# Patient Record
(demographics unavailable — no encounter records)

---

## 2025-05-06 NOTE — DISCUSSION/SUMMARY
[de-identified] : The patient's condition is acute  Tests/Studies Independently Interpreted Today    X-rays of L ankle were obtained and reviewed on 04/30/2025 . 3 views were obtained including AP, lateral, internal oblique. I independently reviewed the films and the clinically relevant findings include questionable mortis widening  ------------------------------------------------------------------------------------------------------------------  Considering questionable Mortis widening on xray rec MRI to further eval   Due to worsening pain recommend the patient obtain MRI L ankle to eval mortis widening. Follow up after MRI to possibly rule out surgical pathology and discuss future treatment options.   The patient will begin use of pneumatic CAM boot to ensure stability and proper healing - fitted and dispensed in office today.   Prescribed PT-rec she get into PT quickly to work on the swelling discussed need for orif of ankle with syndesmotic screw if mri confirms widening , discussed risks and benefits of surgery  The patient has been prescribed Ibuprofen 600mg T.I.D and discussed risks of side effects, as well as timing/management of medication.  Side effects can include but are not limited to gastrointestinal ulcers and irritation, kidney failure, and bleeding issues. Use as directed and take with food to manage pain, inflammation, and discomfort.   f/u after MRI   I, Eli Kline, attest that this documentation has been prepared under the direction and in the presence of Provider Dr. Hao Ramirez

## 2025-05-06 NOTE — HISTORY OF PRESENT ILLNESS
[de-identified] : Pt is here for an eval of left ankle pain. States she fell on her teammates foot at volleyball last night and twisted her ankle. Pain is an 7/10, iced last night with some relief. Pain localized to the ankle, swollen. Cannot walk on it, on crutches and in wrap. No hx of ankle issues.

## 2025-05-06 NOTE — PHYSICAL EXAM
[Left] : left foot and ankle [Moderate] : moderate diffused ankle swelling [] : not able to perform single heel raise [FreeTextEntry9] :  X-rays of L ankle revealed questionable Mortis widening

## 2025-05-06 NOTE — HISTORY OF PRESENT ILLNESS
[de-identified] : Pt is here for an eval of left ankle pain. States she fell on her teammates foot at volleyball last night and twisted her ankle. Pain is an 7/10, iced last night with some relief. Pain localized to the ankle, swollen. Cannot walk on it, on crutches and in wrap. No hx of ankle issues.

## 2025-05-06 NOTE — RETURN TO WORK/SCHOOL
[FreeTextEntry1] :   To whom it may concern   Diagnosis: ankle sprain   _ Patient may return to work: _ Patient may not return to work until: _ Patient may return to school: x_ Patient cannot participate in gym/sports until followup _ Patient may return to gym/sports: _ Patient is on limited weight bearing: _ Patient may return to full activities: x_ Patient requires early release from class/elevator pass until followup   SPECIAL INSTRUCTIONS:   Please excuse the patient,DORCAS ALSTON ,   as She was seen in our office today, 04/30/2025 under the care of Dr. Ramirez   _ Full duty, no restrictions. _ Light duty, as follows: _ Limited duty, as follows: _ With Cast   _  With Brace   _ With Crutches   _ With Boot     Sincerely,   Hao Ramirez MD Co- Chief Sports Medicine [facility] Medical Director [facility] and [facility]  [facility] [employer]

## 2025-05-06 NOTE — HISTORY OF PRESENT ILLNESS
[de-identified] : Pt is here for an eval of left ankle pain. States she fell on her teammates foot at volleyball last night and twisted her ankle. Pain is an 7/10, iced last night with some relief. Pain localized to the ankle, swollen. Cannot walk on it, on crutches and in wrap. No hx of ankle issues.

## 2025-05-06 NOTE — RETURN TO WORK/SCHOOL
[FreeTextEntry1] :   To whom it may concern   Diagnosis: ankle sprain   _ Patient may return to work: _ Patient may not return to work until: _ Patient may return to school: x_ Patient cannot participate in gym/sports until followup _ Patient may return to gym/sports: _ Patient is on limited weight bearing: _ Patient may return to full activities: x_ Patient requires early release from class/elevator pass until followup   SPECIAL INSTRUCTIONS:   Please excuse the patient,DORCAS ALSTON ,   as She was seen in our office today, 04/30/2025 under the care of Dr. Ramirez   _ Full duty, no restrictions. _ Light duty, as follows: _ Limited duty, as follows: _ With Cast   _  With Brace   _ With Crutches   _ With Boot     Sincerely,   Hao Ramirez MD Co- Chief Sports Medicine [facility] Medical Director [facility] and [facility]  [facility] [employer]

## 2025-05-06 NOTE — DISCUSSION/SUMMARY
[de-identified] : The patient's condition is acute  Tests/Studies Independently Interpreted Today    X-rays of L ankle were obtained and reviewed on 04/30/2025 . 3 views were obtained including AP, lateral, internal oblique. I independently reviewed the films and the clinically relevant findings include questionable mortis widening  ------------------------------------------------------------------------------------------------------------------  Considering questionable Mortis widening on xray rec MRI to further eval   Due to worsening pain recommend the patient obtain MRI L ankle to eval mortis widening. Follow up after MRI to possibly rule out surgical pathology and discuss future treatment options.   The patient will begin use of pneumatic CAM boot to ensure stability and proper healing - fitted and dispensed in office today.   Prescribed PT-rec she get into PT quickly to work on the swelling discussed need for orif of ankle with syndesmotic screw if mri confirms widening , discussed risks and benefits of surgery  The patient has been prescribed Ibuprofen 600mg T.I.D and discussed risks of side effects, as well as timing/management of medication.  Side effects can include but are not limited to gastrointestinal ulcers and irritation, kidney failure, and bleeding issues. Use as directed and take with food to manage pain, inflammation, and discomfort.   f/u after MRI   I, Eli Kline, attest that this documentation has been prepared under the direction and in the presence of Provider Dr. Hao Ramirez

## 2025-05-06 NOTE — DISCUSSION/SUMMARY
[de-identified] : The patient's condition is acute  Tests/Studies Independently Interpreted Today    X-rays of L ankle were obtained and reviewed on 04/30/2025 . 3 views were obtained including AP, lateral, internal oblique. I independently reviewed the films and the clinically relevant findings include questionable mortis widening  ------------------------------------------------------------------------------------------------------------------  Considering questionable Mortis widening on xray rec MRI to further eval   Due to worsening pain recommend the patient obtain MRI L ankle to eval mortis widening. Follow up after MRI to possibly rule out surgical pathology and discuss future treatment options.   The patient will begin use of pneumatic CAM boot to ensure stability and proper healing - fitted and dispensed in office today.   Prescribed PT-rec she get into PT quickly to work on the swelling discussed need for orif of ankle with syndesmotic screw if mri confirms widening , discussed risks and benefits of surgery  The patient has been prescribed Ibuprofen 600mg T.I.D and discussed risks of side effects, as well as timing/management of medication.  Side effects can include but are not limited to gastrointestinal ulcers and irritation, kidney failure, and bleeding issues. Use as directed and take with food to manage pain, inflammation, and discomfort.   f/u after MRI   I, Eli Kline, attest that this documentation has been prepared under the direction and in the presence of Provider Dr. Hao Ramirez

## 2025-05-16 NOTE — HISTORY OF PRESENT ILLNESS
[de-identified] : Pt is here for a follow up on left ankle pain. Pain is a 6/10, taking Tylenol as needed. [facility]uled to [facility] at [facility]. Wearing CAM boot.

## 2025-05-16 NOTE — RETURN TO WORK/SCHOOL
[FreeTextEntry1] :    Diagnosis: high ankle sprain & lateral avulsion fracture    X: Patient cannot participate in gym/sports until follow up appointment in two weeks   Sincerely, Hao Ramirez MD Co- Chief Sports Medicine [facility] Medical Director [facility] and [facility]  [facility] [employer]

## 2025-05-16 NOTE — DISCUSSION/SUMMARY
[de-identified] : The patients condition is acute.  Confounding medical conditions/concerns: None  Tests/Studies Independently Interpreted Today: MRI left ankle reveals evidence of nondisplaced avulsion about lateral tibial physis, moderate high ankle sprains, no mortis widening, posterior tibial tendonitis.   We reviewed the mri findings and discussed their diagnosis and treatment options at length including the risks and benefits of both surgical and non-surgical options for the patients high ankle sprain and non-displaced avulsion injury. Overall, her condition is improving through proper immobilization.   The response to non-operative treatment will determine the need for further intervention, which could include additional non-operative modalities, or possible surgical intervention such as lateral ligament reconstruction.  The risks of developing chronic ankle instability and/or osteochondral defects, which may necessitate surgical management, were discussed with the patient.  The patient will begin use of lace-up ankle brace to ensure stability and avoid further injury - fitted and dispensed in office today.   She will continue use of long pneumatic CAM boot for the remainder of the week.   Start physical therapy, attending 2-3x/week for approximately 4-6 weeks   She will remain out of gym and sports, want to ensure healing of avulsion fracture   Prescribed the patient Motrin 600mgs T.I.D and discussed risks of side effects and timing and management of medication. Side effects include but are not limited to gi ulcers and irritation, as well as kidney failure and bleeding issues. Follow up in 2 weeks to discuss a return to gym & sports -- new X-Ray

## 2025-05-16 NOTE — HISTORY OF PRESENT ILLNESS
[de-identified] : Pt is here for a follow up on left ankle pain. Pain is a 6/10, taking Tylenol as needed. [facility]uled to [facility] at [facility]. Wearing CAM boot.

## 2025-05-16 NOTE — PHYSICAL EXAM
[Left] : left foot and ankle [NL (40)] : plantar flexion 40 degrees [NL 30)] : inversion 30 degrees [NL (20)] : eversion 20 degrees [4___] : eversion 4[unfilled]/5 [2+] : posterior tibialis pulse: 2+ [Normal] : saphenous nerve sensation normal [] : no calf tenderness [FreeTextEntry8] : ATFL tenderness, deltoid ligament tenderness, lateral malleolus tenderness  [de-identified] : unable to perform triple hop  [de-identified] : In long pneumatic CAM boot

## 2025-05-16 NOTE — PHYSICAL EXAM
[Left] : left foot and ankle [NL (40)] : plantar flexion 40 degrees [NL 30)] : inversion 30 degrees [NL (20)] : eversion 20 degrees [4___] : eversion 4[unfilled]/5 [2+] : posterior tibialis pulse: 2+ [Normal] : saphenous nerve sensation normal [] : no calf tenderness [FreeTextEntry8] : ATFL tenderness, deltoid ligament tenderness, lateral malleolus tenderness  [de-identified] : unable to perform triple hop  [de-identified] : In long pneumatic CAM boot

## 2025-05-16 NOTE — RETURN TO WORK/SCHOOL
[FreeTextEntry1] :    Diagnosis: high ankle sprain & lateral avulsion fracture    X: Patient cannot participate in gym/sports until follow up appointment in two weeks   Sincerely, Hao Ramirez MD Co- Chief Sports Medicine [facility] Medical Director [facility] and [facility]  [facility] [employer]

## 2025-05-16 NOTE — DISCUSSION/SUMMARY
[de-identified] : The patients condition is acute.  Confounding medical conditions/concerns: None  Tests/Studies Independently Interpreted Today: MRI left ankle reveals evidence of nondisplaced avulsion about lateral tibial physis, moderate high ankle sprains, no mortis widening, posterior tibial tendonitis.   We reviewed the mri findings and discussed their diagnosis and treatment options at length including the risks and benefits of both surgical and non-surgical options for the patients high ankle sprain and non-displaced avulsion injury. Overall, her condition is improving through proper immobilization.   The response to non-operative treatment will determine the need for further intervention, which could include additional non-operative modalities, or possible surgical intervention such as lateral ligament reconstruction.  The risks of developing chronic ankle instability and/or osteochondral defects, which may necessitate surgical management, were discussed with the patient.  The patient will begin use of lace-up ankle brace to ensure stability and avoid further injury - fitted and dispensed in office today.   She will continue use of long pneumatic CAM boot for the remainder of the week.   Start physical therapy, attending 2-3x/week for approximately 4-6 weeks   She will remain out of gym and sports, want to ensure healing of avulsion fracture   Prescribed the patient Motrin 600mgs T.I.D and discussed risks of side effects and timing and management of medication. Side effects include but are not limited to gi ulcers and irritation, as well as kidney failure and bleeding issues. Follow up in 2 weeks to discuss a return to gym & sports -- new X-Ray

## 2025-05-16 NOTE — DATA REVIEWED
[MRI] : MRI [Left] : left [Ankle] : ankle [Report was reviewed and noted in the chart] : The report was reviewed and noted in the chart [I independently reviewed and interpreted images and report] : I independently reviewed and interpreted images and report [I reviewed the films/CD] : I reviewed the films/CD [FreeTextEntry1] : MRI left ankle reveals evidence of nondisplaced avulsion about lateral tibial physis, moderate high ankle sprains, no mortis widening, posterior tibial tendonitis

## 2025-05-29 NOTE — HISTORY OF PRESENT ILLNESS
[de-identified] : 05/20/25: Pt is here to follow up for her left ankle. [facility] Pain is 3-10. Wearing tall CAM boot.

## 2025-05-29 NOTE — DISCUSSION/SUMMARY
[de-identified] : The patients condition is acute.  Confounding medical conditions/concerns: None  Tests/Studies Independently Interpreted Today: X-Ray left ankle 3 views (AP, lateral, internal oblique) were obtained and independently reviewed on Apr 29, 2025 reveals evidence of sclerotic change and callous formation about healed nondisplaced avulsion about lateral tibial physis.   The patient continues to improve on a gradual, interval basis reporting no recurrent symptoms or instability. Grossly benign physical examination upon office visit and is able to triple hop without pain.   We recommended she start to gradually transition form long pneumatic CAM boot into lace-up ankle support At this time, the patient has no activity restrictions and may continue to advance activity as pain permits.   The response to non-operative treatment will determine the need for further intervention, which could include additional non-operative modalities, or possible surgical intervention such as lateral ligament reconstruction.  The risks of developing chronic ankle instability and/or osteochondral defects, which may necessitate surgical management, were discussed with the patient  Prescribed physical therapy today, the patient will attend 2-3x/week for approximately 4-6 weeks   The patient may return to gym and sports in her lace-up   Prescribed the patient Motrin 600mgs T.I.D and discussed risks of side effects and timing and management of medication. Side effects include but are not limited to gi ulcers and irritation, as well as kidney failure and bleeding issues.  Follow up in 2 weeks if any discomfort persists

## 2025-05-29 NOTE — RETURN TO WORK/SCHOOL
[FreeTextEntry1] :    Diagnosis: Left ankle healed avulsion Fx and high ankle sprain    X: Patient may return to school with excused lateness on May 20, 2025     X: Patient may return to gym/sports May 20, 2025     X: Patient may return to full activities May 20, 2025     Sincerely, Hao Ramirez MD Co- Chief Sports Medicine [facility] Medical Director [facility]  [facility] [employer]

## 2025-05-29 NOTE — RETURN TO WORK/SCHOOL
[FreeTextEntry1] :    Diagnosis: Left ankle healed avulsion Fx and high ankle sprain    X: Patient may return to school with excused lateness on May 20, 2025     X: Patient may return to gym/sports May 20, 2025     X: Patient may return to full activities May 20, 2025     Sincerely, Hao Ramirez MD Co- Chief Sports Medicine [facility] Medical Director [facility] and [facility]  [facility] [employer]

## 2025-05-29 NOTE — DISCUSSION/SUMMARY
[de-identified] : The patients condition is acute.  Confounding medical conditions/concerns: None  Tests/Studies Independently Interpreted Today: X-Ray left ankle 3 views (AP, lateral, internal oblique) were obtained and independently reviewed on Apr 29, 2025 reveals evidence of sclerotic change and callous formation about healed nondisplaced avulsion about lateral tibial physis.   The patient continues to improve on a gradual, interval basis reporting no recurrent symptoms or instability. Grossly benign physical examination upon office visit and is able to triple hop without pain.   We recommended she start to gradually transition form long pneumatic CAM boot into lace-up ankle support At this time, the patient has no activity restrictions and may continue to advance activity as pain permits.   The response to non-operative treatment will determine the need for further intervention, which could include additional non-operative modalities, or possible surgical intervention such as lateral ligament reconstruction.  The risks of developing chronic ankle instability and/or osteochondral defects, which may necessitate surgical management, were discussed with the patient  Prescribed physical therapy today, the patient will attend 2-3x/week for approximately 4-6 weeks   The patient may return to gym and sports in her lace-up   Prescribed the patient Motrin 600mgs T.I.D and discussed risks of side effects and timing and management of medication. Side effects include but are not limited to gi ulcers and irritation, as well as kidney failure and bleeding issues.  Follow up in 2 weeks if any discomfort persists

## 2025-05-29 NOTE — PHYSICAL EXAM
[Left] : left foot and ankle [NL (40)] : plantar flexion 40 degrees [NL 30)] : inversion 30 degrees [NL (20)] : eversion 20 degrees [5___] : eversion 5[unfilled]/5 [2+] : posterior tibialis pulse: 2+ [Normal] : saphenous nerve sensation normal [] : no syndesmosis tenderness [FreeTextEntry8] : Mild ATFL tenderness, no deltoid ligament tenderness, no lateral malleolus tenderness  [de-identified] : -triple hop  [de-identified] : In long pneumatic CAM boot

## 2025-05-29 NOTE — HISTORY OF PRESENT ILLNESS
[de-identified] : 05/20/25: Pt is here to follow up for her left ankle. [facility] Pain is 3-10. Wearing tall CAM boot.

## 2025-05-29 NOTE — DISCUSSION/SUMMARY
[de-identified] : The patients condition is acute.  Confounding medical conditions/concerns: None  Tests/Studies Independently Interpreted Today: X-Ray left ankle 3 views (AP, lateral, internal oblique) were obtained and independently reviewed on Apr 29, 2025 reveals evidence of sclerotic change and callous formation about healed nondisplaced avulsion about lateral tibial physis.   The patient continues to improve on a gradual, interval basis reporting no recurrent symptoms or instability. Grossly benign physical examination upon office visit and is able to triple hop without pain.   We recommended she start to gradually transition form long pneumatic CAM boot into lace-up ankle support At this time, the patient has no activity restrictions and may continue to advance activity as pain permits.   The response to non-operative treatment will determine the need for further intervention, which could include additional non-operative modalities, or possible surgical intervention such as lateral ligament reconstruction.  The risks of developing chronic ankle instability and/or osteochondral defects, which may necessitate surgical management, were discussed with the patient  Prescribed physical therapy today, the patient will attend 2-3x/week for approximately 4-6 weeks   The patient may return to gym and sports in her lace-up   Prescribed the patient Motrin 600mgs T.I.D and discussed risks of side effects and timing and management of medication. Side effects include but are not limited to gi ulcers and irritation, as well as kidney failure and bleeding issues.  Follow up in 2 weeks if any discomfort persists

## 2025-05-29 NOTE — HISTORY OF PRESENT ILLNESS
[de-identified] : 05/20/25: Pt is here to follow up for her left ankle. [facility] Pain is 3-10. Wearing tall CAM boot.

## 2025-05-29 NOTE — PHYSICAL EXAM
[Left] : left foot and ankle [NL (40)] : plantar flexion 40 degrees [NL 30)] : inversion 30 degrees [NL (20)] : eversion 20 degrees [5___] : eversion 5[unfilled]/5 [2+] : posterior tibialis pulse: 2+ [Normal] : saphenous nerve sensation normal [] : no syndesmosis tenderness [FreeTextEntry8] : Mild ATFL tenderness, no deltoid ligament tenderness, no lateral malleolus tenderness  [de-identified] : -triple hop  [de-identified] : In long pneumatic CAM boot

## 2025-05-29 NOTE — PHYSICAL EXAM
[Left] : left foot and ankle [NL (40)] : plantar flexion 40 degrees [NL 30)] : inversion 30 degrees [NL (20)] : eversion 20 degrees [5___] : eversion 5[unfilled]/5 [2+] : posterior tibialis pulse: 2+ [Normal] : saphenous nerve sensation normal [] : no syndesmosis tenderness [FreeTextEntry8] : Mild ATFL tenderness, no deltoid ligament tenderness, no lateral malleolus tenderness  [de-identified] : -triple hop  [de-identified] : In long pneumatic CAM boot

## 2025-05-29 NOTE — RETURN TO WORK/SCHOOL
[FreeTextEntry1] :    Diagnosis: Left ankle healed avulsion Fx and high ankle sprain    X: Patient may return to school with excused lateness on May 20, 2025     X: Patient may return to gym/sports May 20, 2025     X: Patient may return to full activities May 20, 2025     Sincerely, Hao Ramirez MD Co- Chief Sports Medicine [facility] Medical Director [facility]  [facility] [employer]

## 2025-07-15 NOTE — HISTORY OF PRESENT ILLNESS
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  She denies any cardiovascular complaints such as cyanosis, palpitations, chest pain, exercise intolerance, or syncope.  She is on iron low iron anemia.  There are no allergies.  By history she has not elevated cholesterol due to liberal cheese intake.  There are no known medication allergies; she is allergic to apples.  She will be entering the 10th grade at [employer] and plays competitive volleyball.

## 2025-07-15 NOTE — REASON FOR VISIT
[Follow-Up] : a follow-up visit for [Father] : father [FreeTextEntry3] : Sports Clearance history of LV non compaction

## 2025-07-15 NOTE — CLEARANCE
[Cleared] : ~He/She~ is cleared for dental procedure.  [Does not require] : ~He/She~ does not require SBE prophylaxis [May participate in the entire physical education program without restriction, including all varsity] : ~He/She~ may participate in the entire physical education program without restriction, including all varsity competitive sports.

## 2025-07-15 NOTE — DISCUSSION/SUMMARY
[PE + No Restrictions] : [unfilled] may participate in the entire physical education program without restriction, including all varsity competitive sports. [Needs SBE Prophylaxis] : [unfilled] does not need bacterial endocarditis prophylaxis [FreeTextEntry1] : Follow-up in 1 year or as needed

## 2025-07-15 NOTE — CONSULT LETTER
[Today's Date] : [unfilled] [Dear  ___:] : Dear Dr. [unfilled]: [Consult - Single Provider] : Thank you very much for allowing me to participate in the care of this patient. If you have any questions, please do not hesitate to contact me. [Sincerely,] : Sincerely, [FreeTextEntry4] : Dr Hanson [FreeTextEntry5] : [address] [FreeTextEntry6] : [facility] [id number]

## 2025-07-15 NOTE — PHYSICAL EXAM
[General Appearance - Alert] : alert [General Appearance - In No Acute Distress] : in no acute distress [General Appearance - Well Nourished] : well nourished [General Appearance - Well Developed] : well developed [General Appearance - Well-Appearing] : well appearing [Appearance Of Head] : the head was normocephalic [Facies] : there were no dysmorphic facial features [Sclera] : the conjunctiva were normal [PERRL With Normal Accommodation] : the pupils were equal in size, round, and reactive to light [Outer Ear] : the ears and nose were normal in appearance [Examination Of The Oral Cavity] : mucous membranes were moist and pink [Auscultation Breath Sounds / Voice Sounds] : breath sounds clear to auscultation bilaterally [Normal Chest Appearance] : the chest was normal in appearance [Apical Impulse] : quiet precordium with normal apical impulse [Heart Rate And Rhythm] : normal heart rate and rhythm [Heart Sounds] : normal S1 and S2 [No Murmur] : no murmurs  [Heart Sounds Gallop] : no gallops [Heart Sounds Pericardial Friction Rub] : no pericardial rub [Edema] : no edema [Arterial Pulses] : normal upper and lower extremity pulses with no pulse delay [Heart Sounds Click] : no clicks [Capillary Refill Test] : normal capillary refill [Bowel Sounds] : normal bowel sounds [Abdomen Soft] : soft [Nondistended] : nondistended [Abdomen Tenderness] : non-tender [Nail Clubbing] : no clubbing  or cyanosis of the fingers [Cervical Lymph Nodes Enlarged Anterior] : The anterior cervical nodes were normal [Cervical Lymph Nodes Enlarged Posterior] : The posterior cervical nodes were normal [] : no rash [Skin Lesions] : no lesions [Skin Turgor] : normal turgor [Demonstrated Behavior - Infant Nonreactive To Parents] : interactive [Mood] : mood and affect were appropriate for age [Demonstrated Behavior] : normal behavior [FreeTextEntry3] : Wears glasses

## 2025-07-15 NOTE — CARDIOLOGY SUMMARY
[Today's Date] : [unfilled] [FreeTextEntry1] : Sinus rhythm, rate 60 bpm, QRS axis +48 degrees, FL 0.13, QRS 0.09, QTc 0.43 seconds and is within normal limits for age. [FreeTextEntry2] : Summary:  1. Mild-to-moderate tricuspid regurgitation via two regurgitant jets. 2. Normal mitral, aortic, and pulmonary valve function. 3. Mild noncompaction quality of the left ventricle. Normal left ventricular size and global systolic   function. 4. Normal right ventricular morphology with qualitatively normal size and systolic function. 5. Trivial inferior and posterior pericardial effusion, unchanged from prior. 6. Compared to the previous echocardiogram; tricuspid regurgitation has slightly improved.  [de-identified] : Ordered [de-identified] : CBC, reticulocyte count, serum iron, TIBC, ferritin level, fasting lipid profile, LPA, CMP, CRP, homocysteine level